# Patient Record
Sex: MALE | Race: WHITE | Employment: OTHER | URBAN - NONMETROPOLITAN AREA
[De-identification: names, ages, dates, MRNs, and addresses within clinical notes are randomized per-mention and may not be internally consistent; named-entity substitution may affect disease eponyms.]

---

## 2017-02-13 ENCOUNTER — OFFICE VISIT (OUTPATIENT)
Dept: FAMILY MEDICINE | Facility: OTHER | Age: 67
End: 2017-02-13
Payer: COMMERCIAL

## 2017-02-13 VITALS
TEMPERATURE: 97.9 F | BODY MASS INDEX: 36.83 KG/M2 | HEART RATE: 74 BPM | HEIGHT: 66 IN | WEIGHT: 229.2 LBS | OXYGEN SATURATION: 98 % | DIASTOLIC BLOOD PRESSURE: 88 MMHG | RESPIRATION RATE: 12 BRPM | SYSTOLIC BLOOD PRESSURE: 136 MMHG

## 2017-02-13 DIAGNOSIS — Z00.01 ENCOUNTER FOR ROUTINE ADULT MEDICAL EXAM WITH ABNORMAL FINDINGS: Primary | ICD-10-CM

## 2017-02-13 DIAGNOSIS — Z13.6 CARDIOVASCULAR SCREENING; LDL GOAL LESS THAN 130: ICD-10-CM

## 2017-02-13 DIAGNOSIS — I10 HYPERTENSION GOAL BP (BLOOD PRESSURE) < 140/90: ICD-10-CM

## 2017-02-13 DIAGNOSIS — E66.09 NON MORBID OBESITY DUE TO EXCESS CALORIES: ICD-10-CM

## 2017-02-13 LAB
ANION GAP SERPL CALCULATED.3IONS-SCNC: 5 MMOL/L (ref 3–14)
BUN SERPL-MCNC: 19 MG/DL (ref 7–30)
CALCIUM SERPL-MCNC: 8.9 MG/DL (ref 8.5–10.1)
CHLORIDE SERPL-SCNC: 109 MMOL/L (ref 94–109)
CHOLEST SERPL-MCNC: 183 MG/DL
CO2 SERPL-SCNC: 32 MMOL/L (ref 20–32)
CREAT SERPL-MCNC: 1.13 MG/DL (ref 0.66–1.25)
GFR SERPL CREATININE-BSD FRML MDRD: 65 ML/MIN/1.7M2
GLUCOSE SERPL-MCNC: 121 MG/DL (ref 70–99)
HDLC SERPL-MCNC: 52 MG/DL
LDLC SERPL CALC-MCNC: 102 MG/DL
NONHDLC SERPL-MCNC: 131 MG/DL
POTASSIUM SERPL-SCNC: 4.8 MMOL/L (ref 3.4–5.3)
SODIUM SERPL-SCNC: 146 MMOL/L (ref 133–144)
TRIGL SERPL-MCNC: 144 MG/DL

## 2017-02-13 PROCEDURE — 80061 LIPID PANEL: CPT | Performed by: FAMILY MEDICINE

## 2017-02-13 PROCEDURE — 99000 SPECIMEN HANDLING OFFICE-LAB: CPT | Performed by: FAMILY MEDICINE

## 2017-02-13 PROCEDURE — 80307 DRUG TEST PRSMV CHEM ANLYZR: CPT | Mod: 90 | Performed by: FAMILY MEDICINE

## 2017-02-13 PROCEDURE — 99397 PER PM REEVAL EST PAT 65+ YR: CPT | Performed by: FAMILY MEDICINE

## 2017-02-13 PROCEDURE — 36415 COLL VENOUS BLD VENIPUNCTURE: CPT | Performed by: FAMILY MEDICINE

## 2017-02-13 PROCEDURE — 80048 BASIC METABOLIC PNL TOTAL CA: CPT | Performed by: FAMILY MEDICINE

## 2017-02-13 RX ORDER — METOPROLOL TARTRATE 100 MG
100 TABLET ORAL DAILY
Qty: 90 TABLET | Refills: 3 | Status: SHIPPED | OUTPATIENT
Start: 2017-02-13 | End: 2018-03-19

## 2017-02-13 ASSESSMENT — PAIN SCALES - GENERAL: PAINLEVEL: NO PAIN (0)

## 2017-02-13 NOTE — LETTER
[facility]  [address] [facility] [address]  Phone: [phone]          February 16, 2017    Alec Freitas  [address] [place] [id number]          Dear Alec,      LAB RESULTS:     The results of your recent urine nicotine, Lipid profile and Renal profile were NORMAL. Your fasting glucose was a little higher than in past years. Please work on diet and exercise and plan to recheck in 1 year. If you have any further questions or problems, please contact our office.          Sincerely,      Harjit Hernandez M.D.

## 2017-02-13 NOTE — PROGRESS NOTES
SUBJECTIVE:                                                            Alec Freitas is a 66 year old male who presents for Preventive Visit.      Are you in the first 12 months of your Medicare Part B coverage?  Not on medicare    Healthy Habits:    Do you get at least three servings of calcium containing foods daily (dairy, green leafy vegetables, etc.)? yes    Amount of exercise or daily activities, outside of work: 3-4 day(s) per week    Problems taking medications regularly No    Medication side effects: No    Have you had an eye exam in the past two years? yes    Do you see a dentist twice per year? yes    Do you have sleep apnea, excessive snoring or daytime drowsiness?no    COGNITIVE SCREEN  1) Repeat 3 items (Banana, Sunrise, Chair)    2) Clock draw: NORMAL  3) 3 item recall: Recalls 3 objects  Results: NORMAL clock, 1-2 items recalled: COGNITIVE IMPAIRMENT LESS LIKELY    Mini-CogTM Copyright S Maren. Licensed by the author for use in [facility]; reprinted with permission (jennifer@[email or web address]). All rights reserved.                All Histories reviewed and updated in [facility] as appropriate.  Social History   Substance Use Topics     Smoking status: Never Smoker     Smokeless tobacco: Former User     Types: Chew     Alcohol use 1.0 oz/week      Comment: once in a while       The patient does not drink >3 drinks per day nor >7 drinks per week.    Today's PHQ-2 Score:   PHQ-2 ( 1999 Pf[employer]) 2/10/2016 10/23/2015   Q1: Little interest or pleasure in doing things 0 0   Q2: Feeling down, depressed or hopeless 0 0   PHQ-2 Score 0 0       Do you feel safe in your environment - Yes    Do you have a Health Care Directive?: No: Advance care planning was reviewed with patient; patient declined at this time.    Current providers sharing in care for this patient include:   Patient Care Team:  Harjit Hernandez MD as PCP - General      Hearing impairment: No    Ability to successfully perform activities of daily  living: Yes, no assistance needed     Fall risk:  Fallen 2 or more times in the past year?: No  Any fall with injury in the past year?: No    Home safety:  none identified      The following health maintenance items are reviewed in Epic and correct as of today:  Health Maintenance   Topic Date Due     HEPATITIS C SCREENING  03/04/1968     PNEUMOCOCCAL (1 of 2 - PCV13) 03/04/2015     AORTIC ANEURYSM SCREENING (SYSTEM ASSIGNED)  03/04/2015     COLONOSCOPY Q10 YR INBASKET MESSAGE  04/28/2016     INFLUENZA VACCINE (SYSTEM ASSIGNED)  09/01/2016     ADVANCE DIRECTIVE PLANNING Q5 YRS (NO INBASKET)  01/02/2017     FALL RISK ASSESSMENT  02/10/2017     LIPID SCREEN Q5 YR MALE (SYSTEM ASSIGNED)  02/10/2021     TETANUS IMMUNIZATION (SYSTEM ASSIGNED)  02/10/2026         Pneumonia Vaccine:Adults age 65+ who have not received previous Pneumovax (PPSV23) or PCV13 as an adult: Should first be given PCV13 AND then should be given PPSV23 6-12 months after PCV13     ROS:  C: NEGATIVE for fever, chills, change in weight  INTEGUMENTARY/SKIN: NEGATIVE for worrisome rashes, moles or lesions and POSITIVE for rash lower legs right  E/M: NEGATIVE for ear, mouth and throat problems  R: NEGATIVE for significant cough or SOB  CV: NEGATIVE for chest pain, palpitations or peripheral edema  ROS otherwise negative    Problem list, Medication list, Allergies, and Medical/Social/Surgical histories reviewed in EPIC and updated as appropriate.  Labs reviewed in EPIC  BP Readings from Last 3 Encounters:   02/13/17 136/88   08/11/16 143/87   02/10/16 138/88    Wt Readings from Last 3 Encounters:   02/13/17 229 lb 3.2 oz (104 kg)   02/10/16 228 lb 3.2 oz (103.5 kg)   10/23/15 230 lb (104.3 kg)                  Patient Active Problem List   Diagnosis     CARDIOVASCULAR SCREENING; LDL GOAL LESS THAN 130     Hypertension goal BP (blood pressure) < 140/90     Advanced directives, counseling/discussion     Past Surgical History   Procedure Laterality Date      "Hc colonoscopy w/wo brush/wash  04/28/06     Repeat in 10 years     Fusion lumbar posterior artificial disk with system  8/30/10     [facility]       Social History   Substance Use Topics     Smoking status: Never Smoker     Smokeless tobacco: Former User     Types: Chew     Alcohol use 1.0 oz/week      Comment: once in a while     Family History   Problem Relation Age of Onset     Hypertension Mother      C.A.D. Mother      Hypertension Brother      CEREBROVASCULAR DISEASE Maternal Grandmother      CEREBROVASCULAR DISEASE Maternal Grandfather      CANCER Father      unknown primary spread to lungs     HEART DISEASE Father      pacemaker-since he was in his 70's     C.A.D. Brother      Stent X 4     Anesthesia Reaction No family hx of          Current Outpatient Prescriptions   Medication Sig Dispense Refill     Naproxen Sodium (ALEVE PO)        metoprolol (LOPRESSOR) 100 MG tablet Take 1 tablet (100 mg) by mouth daily 90 tablet 3     ASPIRIN 81 MG OR TABS one daily 100 0     ibuprofen (MOTRIN CHILD DROPS) 40 MG/ML suspension Take by mouth every 6 hours as needed for moderate pain or fever Reported on 2/13/2017       [DISCONTINUED] metoprolol (LOPRESSOR) 100 MG tablet Take 1 tablet (100 mg) by mouth daily 90 tablet 3     Allergies   Allergen Reactions     No Known Drug Allergies      Recent Labs   Lab Test  02/10/16   0941  01/26/15   0810  01/02/14   1648  12/03/12   1634   LDL  90   --   107  117   HDL  50   --   54  46   TRIG  115   --   154*  120   CR  1.12  1.09  1.05   --    GFRESTIMATED  66  68  71   --    GFRESTBLACK  79  82  86   --    POTASSIUM  4.5  4.6  5.1   --       OBJECTIVE:                                                            /90 (BP Location: Left arm, Patient Position: Chair, Cuff Size: Adult Large)  Pulse 74  Temp 97.9  F (36.6  C) (Tympanic)  Resp 12  Ht 5' 5.95\" (1.675 m)  Wt 229 lb 3.2 oz (104 kg)  SpO2 98%  BMI 37.06 kg/m2 Estimated body mass index is 37.06 kg/(m^2) " "as calculated from the following:    Height as of this encounter: 5' 5.95\" (1.675 m).    Weight as of this encounter: 229 lb 3.2 oz (104 kg).  EXAM:   GENERAL: healthy, alert and no distress  EYES: Eyes grossly normal to inspection, PERRL and conjunctivae and sclerae normal  HENT: ear canals and TM's normal, nose and mouth without ulcers or lesions  NECK: no adenopathy, no asymmetry, masses, or scars and thyroid normal to palpation  RESP: lungs clear to auscultation - no rales, rhonchi or wheezes  CV: regular rate and rhythm, normal S1 S2, no S3 or S4, no murmur, click or rub, no peripheral edema and peripheral pulses strong  ABDOMEN: soft, nontender, no hepatosplenomegaly, no masses and bowel sounds normal  MS: no gross musculoskeletal defects noted, no edema  SKIN: no suspicious lesions or rashes and lichenification - lower legs  NEURO: Normal strength and tone, mentation intact and speech normal  PSYCH: mentation appears normal, affect normal/bright    ASSESSMENT / PLAN:                                                                ICD-10-CM    1. Encounter for routine adult medical exam with abnormal findings Z00.01 Nicotine and Cotinine Urine   2. Hypertension goal BP (blood pressure) < 140/90 I10 Basic metabolic panel     metoprolol (LOPRESSOR) 100 MG tablet   3. CARDIOVASCULAR SCREENING; LDL GOAL LESS THAN 130 Z13.6 Lipid panel reflex to direct LDL       End of Life Planning:  Patient currently has an advanced directive: No.  I have verified the patient's ablity to prepare an advanced directive/make health care decisions.  Literature was provided to assist patient in preparing an advanced directive.    COUNSELING:  Reviewed preventive health counseling, as reflected in patient instructions       Regular exercise       Healthy diet/nutrition       Vision screening       Vaccinated for: Pneumococcal and Zoster       Colon cancer screening        Estimated body mass index is 37.06 kg/(m^2) as calculated from " "the following:    Height as of this encounter: 5' 5.95\" (1.675 m).    Weight as of this encounter: 229 lb 3.2 oz (104 kg).  Weight management plan: Discussed healthy diet and exercise guidelines and patient will follow up in 12 months in clinic to re-evaluate.   reports that he has never smoked. He has quit using smokeless tobacco. His smokeless tobacco use included Chew.      Appropriate preventive services were discussed with this patient, including applicable screening as appropriate for cardiovascular disease, diabetes, osteopenia/osteoporosis, and glaucoma.  As appropriate for age/gender, discussed screening for colorectal cancer, prostate cancer, breast cancer, and cervical cancer. Checklist reviewing preventive services available has been given to the patient.    Reviewed patients plan of care and provided an AVS. The Basic Care Plan (routine screening as documented in Health Maintenance) for Alec meets the Care Plan requirement. This Care Plan has been established and reviewed with the Patient.    Counseling Resources:  ATP IV Guidelines  Pooled Cohorts Equation Calculator  Breast Cancer Risk Calculator  FRAX Risk Assessment  ICSI Preventive Guidelines  Dietary Guidelines for Americans, 2010  USDA's MyPlate  ASA Prophylaxis  Lung CA Screening    Harjit Hernandez MD  [facility]  "

## 2017-02-13 NOTE — MR AVS SNAPSHOT
After Visit Summary   2/13/2017    Alec Freitas    MRN: [id number]           Patient Information     Date Of Birth          1950        Visit Information        Provider Department      2/13/2017 8:40 AM Harjit Hernandez MD [facility]        Today's Diagnoses     Encounter for Medicare annual wellness exam    -  1    Hypertension goal BP (blood pressure) < 140/90        CARDIOVASCULAR SCREENING; LDL GOAL LESS THAN 130          Care Instructions      Preventive Health Recommendations:   Male Ages 65 and over    Yearly exam:             See your health care provider every year in order to  o   Review health changes.   o   Discuss preventive care.    o   Review your medicines if your doctor has prescribed any.    Talk with your health care provider about whether you should have a test to screen for prostate cancer (PSA).    Every 3 years, have a diabetes test (fasting glucose). If you are at risk for diabetes, you should have this test more often.    Every 5 years, have a cholesterol test. Have this test more often if you are at risk for high cholesterol or heart disease.     Every 10 years, have a colonoscopy. Or, have a yearly FIT test (stool test). These exams will check for colon cancer.    Talk to with your health care provider about screening for Abdominal Aortic Aneurysm if you have a family history of AAA or have a history of smoking.    Shots:     Get a flu shot each year.     Get a tetanus shot every 10 years.     Talk to your doctor about your pneumonia vaccines. There are now two you should receive - Pneumovax (PPSV 23) and Prevnar (PCV 13).     Talk to your doctor about a shingles vaccine.     Talk to your doctor about the hepatitis B vaccine.  Nutrition:     Eat at least 5 servings of fruits and vegetables each day.     Eat whole-grain bread, whole-wheat pasta and brown rice instead of white grains and rice.     Talk to your provider about Calcium and Vitamin D.  "  Lifestyle    Exercise for at least 150 minutes a week (30 minutes a day, 5 days a week). This will help you control your weight and prevent disease.     Limit alcohol to one drink per day.     No smoking.     Wear sunscreen to prevent skin cancer.     See your dentist every six months for an exam and cleaning.     See your eye doctor every 1 to 2 years to screen for conditions such as glaucoma, macular degeneration, cataracts, etc         Follow-ups after your visit        Follow-up notes from your care team     Return in about 1 year (around 2/13/2018) for Physical Exam.      Who to contact     If you have questions or need follow up information about today's clinic visit or your schedule please contact [facility] directly at [phone].  Normal or non-critical lab and imaging results will be communicated to you by zLensehart, letter or phone within 4 business days after the clinic has received the results. If you do not hear from us within 7 days, please contact the clinic through zLensehart or phone. If you have a critical or abnormal lab result, we will notify you by phone as soon as possible.  Submit refill requests through Aurality or call your pharmacy and they will forward the refill request to us. Please allow 3 business days for your refill to be completed.          Additional Information About Your Visit        Aurality Information     Aurality lets you send messages to your doctor, view your test results, renew your prescriptions, schedule appointments and more. To sign up, go to [email or web address] . Click on \"Log in\" on the left side of the screen, which will take you to the Welcome page. Then click on \"Sign up Now\" on the right side of the page.     You will be asked to enter the access code listed below, as well as some personal information. Please follow the directions to create your username and password.     Your access code is: [email or web address]  Expires: 5/14/2017  9:13 AM     Your access " "code will  in 90 days. If you need help or a new code, please call your [facility] clinic or [phone].        Care EveryWhere ID     This is your Care EveryWhere ID. This could be used by other organizations to access your [facility] medical records  [id number]        Your Vitals Were     Pulse Temperature Respirations Height Pulse Oximetry BMI (Body Mass Index)    74 97.9  F (36.6  C) (Tympanic) 12 5' 5.95\" (1.675 m) 98% 37.06 kg/m2       Blood Pressure from Last 3 Encounters:   17 136/88   16 143/87   02/10/16 138/88    Weight from Last 3 Encounters:   17 229 lb 3.2 oz (104 kg)   02/10/16 228 lb 3.2 oz (103.5 kg)   10/23/15 230 lb (104.3 kg)              We Performed the Following     Basic metabolic panel     Lipid panel reflex to direct LDL     Nicotine and Cotinine Urine          Where to get your medicines      [facility] [address] [facility]  [address] [place] [address]     Phone:  [phone]     metoprolol 100 MG tablet          Primary Care Provider Office Phone # Fax #    Harjit Hernandez [phone]       [facility] [address] [facility] [address]        Thank you!     Thank you for choosing [facility]  for your care. Our goal is always to provide you with excellent care. Hearing back from our patients is one way we can continue to improve our services. Please take a few minutes to complete the written survey that you may receive in the mail after your visit with us. Thank you!             Your Updated Medication List - Protect others around you: Learn how to safely use, store and throw away your medicines at www.disposemymeds.org.          This list is accurate as of: 17  9:13 AM.  Always use your most recent med list.                   Brand Name Dispense Instructions for use    ALEVE PO          aspirin 81 MG tablet     100    one daily       ibuprofen 40 MG/ML suspension "    MOTRIN CHILD DROPS     Take by mouth every 6 hours as needed for moderate pain or fever Reported on 2/13/2017       metoprolol 100 MG tablet    LOPRESSOR    90 tablet    Take 1 tablet (100 mg) by mouth daily

## 2017-02-14 LAB — COTININE UR QL: NORMAL

## 2018-03-19 DIAGNOSIS — I10 HYPERTENSION GOAL BP (BLOOD PRESSURE) < 140/90: ICD-10-CM

## 2018-03-20 RX ORDER — METOPROLOL TARTRATE 100 MG
TABLET ORAL
Qty: 30 TABLET | Refills: 0 | Status: SHIPPED | OUTPATIENT
Start: 2018-03-20 | End: 2018-04-13

## 2018-03-20 NOTE — TELEPHONE ENCOUNTER
"Requested Prescriptions   Pending Prescriptions Disp Refills     metoprolol tartrate (LOPRESSOR) 100 MG tablet [Pharmacy Med Name: METOPROLOL TARTRATE 100MG TABS] 90 tablet 3     Sig: TAKE ONE TABLET BY MOUTH EVERY DAY    Beta-Blockers Protocol Failed    3/19/2018  9:15 PM       Failed - Blood pressure under 140/90 in past 12 months    BP Readings from Last 3 Encounters:   02/13/17 136/88   08/11/16 143/87   02/10/16 138/88                Failed - Recent (12 mo) or future (30 days) visit within the authorizing provider's specialty    Patient had office visit in the last 12 months or has a visit in the next 30 days with authorizing provider or within the authorizing provider's specialty.  See \"Patient Info\" tab in inbasket, or \"Choose Columns\" in Meds & Orders section of the refill encounter.           Passed - Patient is age 6 or older        Last Written Prescription Date:  2/13/17  Last Fill Quantity: 90,  # refills: 3   Last office visit: 2/13/2017 with prescribing provider:     Future Office Visit:      "

## 2018-04-13 ENCOUNTER — OFFICE VISIT (OUTPATIENT)
Dept: FAMILY MEDICINE | Facility: OTHER | Age: 68
End: 2018-04-13
Payer: COMMERCIAL

## 2018-04-13 VITALS
HEIGHT: 66 IN | DIASTOLIC BLOOD PRESSURE: 82 MMHG | RESPIRATION RATE: 16 BRPM | OXYGEN SATURATION: 98 % | SYSTOLIC BLOOD PRESSURE: 122 MMHG | WEIGHT: 227.9 LBS | HEART RATE: 75 BPM | BODY MASS INDEX: 36.62 KG/M2 | TEMPERATURE: 96.5 F

## 2018-04-13 DIAGNOSIS — I10 HYPERTENSION GOAL BP (BLOOD PRESSURE) < 140/90: ICD-10-CM

## 2018-04-13 DIAGNOSIS — E66.01 MORBID OBESITY (H): ICD-10-CM

## 2018-04-13 DIAGNOSIS — Z00.00 MEDICARE ANNUAL WELLNESS VISIT, SUBSEQUENT: Primary | ICD-10-CM

## 2018-04-13 DIAGNOSIS — Z11.59 NEED FOR HEPATITIS C SCREENING TEST: ICD-10-CM

## 2018-04-13 LAB
ANION GAP SERPL CALCULATED.3IONS-SCNC: 7 MMOL/L (ref 3–14)
BUN SERPL-MCNC: 25 MG/DL (ref 7–30)
CALCIUM SERPL-MCNC: 8.4 MG/DL (ref 8.5–10.1)
CHLORIDE SERPL-SCNC: 107 MMOL/L (ref 94–109)
CO2 SERPL-SCNC: 28 MMOL/L (ref 20–32)
CREAT SERPL-MCNC: 1 MG/DL (ref 0.66–1.25)
GFR SERPL CREATININE-BSD FRML MDRD: 74 ML/MIN/1.7M2
GLUCOSE SERPL-MCNC: 128 MG/DL (ref 70–99)
HCV AB SERPL QL IA: NONREACTIVE
POTASSIUM SERPL-SCNC: 4.5 MMOL/L (ref 3.4–5.3)
SODIUM SERPL-SCNC: 142 MMOL/L (ref 133–144)

## 2018-04-13 PROCEDURE — 80048 BASIC METABOLIC PNL TOTAL CA: CPT | Performed by: FAMILY MEDICINE

## 2018-04-13 PROCEDURE — 36415 COLL VENOUS BLD VENIPUNCTURE: CPT | Performed by: FAMILY MEDICINE

## 2018-04-13 PROCEDURE — G0439 PPPS, SUBSEQ VISIT: HCPCS | Performed by: FAMILY MEDICINE

## 2018-04-13 PROCEDURE — 86803 HEPATITIS C AB TEST: CPT | Performed by: FAMILY MEDICINE

## 2018-04-13 RX ORDER — METOPROLOL TARTRATE 100 MG
100 TABLET ORAL DAILY
Qty: 90 TABLET | Refills: 3 | Status: SHIPPED | OUTPATIENT
Start: 2018-04-13

## 2018-04-13 ASSESSMENT — ACTIVITIES OF DAILY LIVING (ADL)
CURRENT_FUNCTION: NO ASSISTANCE NEEDED
I_NEED_ASSISTANCE_FOR_THE_FOLLOWING_DAILY_ACTIVITIES:: NO ASSISTANCE IS NEEDED

## 2018-04-13 ASSESSMENT — PAIN SCALES - GENERAL: PAINLEVEL: NO PAIN (0)

## 2018-04-13 NOTE — NURSING NOTE
"Chief Complaint   Patient presents with     Wellness Visit       Initial BP (!) 152/98 (BP Location: Right arm, Patient Position: Sitting, Cuff Size: Adult Large)  Pulse 75  Temp 96.5  F (35.8  C) (Temporal)  Resp 16  Ht 5' 5.75\" (1.67 m)  Wt 227 lb 14.4 oz (103.4 kg)  SpO2 98%  BMI 37.06 kg/m2 Estimated body mass index is 37.06 kg/(m^2) as calculated from the following:    Height as of this encounter: 5' 5.75\" (1.67 m).    Weight as of this encounter: 227 lb 14.4 oz (103.4 kg).  Medication Reconciliation: complete     Health Maintenance Due   Topic Date Due     HEPATITIS C SCREENING  03/04/1968     PNEUMOCOCCAL (1 of 2 - PCV13) 03/04/2015     AORTIC ANEURYSM SCREENING (SYSTEM ASSIGNED)  03/04/2015     ADVANCE DIRECTIVE PLANNING Q5 YRS  01/02/2017     FALL RISK ASSESSMENT  02/13/2018     Lluvia Valdez CMA      "

## 2018-04-13 NOTE — LETTER
April 16, 2018      Alec Freitas  [address] [facility] [address]        Dear Mr.Van Paez,    We are writing to inform you of your test results.    Kidney function remains stable. Fasting glucose was slightly elevated and should be rechecked in 4-6 weeks. One time screening for hepatitis C was negative.     Resulted Orders   Basic metabolic panel  (Ca, Cl, CO2, Creat, Gluc, K, Na, BUN)   Result Value Ref Range    Sodium 142 133 - 144 mmol/L    Potassium 4.5 3.4 - 5.3 mmol/L    Chloride 107 94 - 109 mmol/L    Carbon Dioxide 28 20 - 32 mmol/L    Anion Gap 7 3 - 14 mmol/L    Glucose 128 (H) 70 - 99 mg/dL      Comment:      Fasting specimen    Urea Nitrogen 25 7 - 30 mg/dL    Creatinine 1.00 0.66 - 1.25 mg/dL    GFR Estimate 74 >60 mL/min/1.7m2      Comment:      Non  GFR Calc    GFR Estimate If Black 90 >60 mL/min/1.7m2      Comment:       GFR Calc    Calcium 8.4 (L) 8.5 - 10.1 mg/dL   Hepatitis C antibody   Result Value Ref Range    Hepatitis C Antibody Nonreactive NR^Nonreactive      Comment:      Assay performance characteristics have not been established for newborns,   infants, and children         If you have any questions or concerns, please call the clinic at the number listed above.       Sincerely,        Harjit Hernandez MD

## 2018-04-13 NOTE — PROGRESS NOTES
SUBJECTIVE:   Alec Freitas is a 68 year old male who presents for Preventive Visit.      Are you in the first 12 months of your Medicare coverage?  No    Physical   Annual:     Getting at least 3 servings of Calcium per day::  Yes    Bi-annual eye exam::  Yes    Dental care twice a year::  Yes    Sleep apnea or symptoms of sleep apnea::  None    Diet::  Regular (no restrictions)    Taking medications regularly::  Yes    Medication side effects::  None    Additional concerns today::  YES    Ability to successfully perform activities of daily living: no assistance needed  Home Safety:  No safety concerns identified  Hearing Impairment: no hearing concerns        Fall risk:  Fallen 2 or more times in the past year?: No  Any fall with injury in the past year?: No    COGNITIVE SCREEN  1) Repeat 3 items (Banana, Sunrise, Chair)    2) Clock draw: NORMAL  3) 3 item recall: Recalls 2 objects   Results: NORMAL clock, 1-2 items recalled: COGNITIVE IMPAIRMENT LESS LIKELY    Mini-CogTM Copyright IMELDA Engel. Licensed by the author for use in [facility]; reprinted with permission (jennifer@[facility]). All rights reserved.        Reviewed and updated as needed this visit by clinical staff  Tobacco  Allergies  Meds  Problems  Soc Hx        Reviewed and updated as needed this visit by Provider  Allergies  Meds  Problems        Social History   Substance Use Topics     Smoking status: Never Smoker     Smokeless tobacco: Former User     Types: Chew     Alcohol use 1.0 oz/week      Comment: once in a while       Alcohol Use 4/13/2018   If you drink alcohol do you typically have greater than 3 drinks per day OR greater than 7 drinks per week? Yes   AUDIT SCORE  4           PROBLEMS TO ADD ON...  Hypertension Follow-up      Outpatient blood pressures are not being checked.    Low Salt Diet: no added salt      Today's PHQ-2 Score:   PHQ-2 ( 1999 Pfizer) 4/13/2018   Q1: Little interest or pleasure in doing things 0   Q2:  Feeling down, depressed or hopeless 0   PHQ-2 Score 0   Q1: Little interest or pleasure in doing things Not at all   Q2: Feeling down, depressed or hopeless Not at all   PHQ-2 Score 0       Do you feel safe in your environment - Yes    Do you have a Health Care Directive?: No: Advance care planning reviewed with patient; information given to patient to review.    Current providers sharing in care for this patient include:   Patient Care Team:  Harjit Hernandez MD as PCP - General    The following health maintenance items are reviewed in Epic and correct as of today:  Health Maintenance   Topic Date Due     HEPATITIS C SCREENING  [birth year]04/1968     PNEUMOCOCCAL (1 of 2 - PCV13) 03/04/2015     AORTIC ANEURYSM SCREENING (SYSTEM ASSIGNED)  03/04/2015     ADVANCE DIRECTIVE PLANNING Q5 YRS  01/02/2017     FALL RISK ASSESSMENT  02/13/2018     INFLUENZA VACCINE (SYSTEM ASSIGNED)  09/01/2018     COLONOSCOPY Q5 YR  10/21/2021     LIPID SCREEN Q5 YR MALE (SYSTEM ASSIGNED)  02/13/2022     TETANUS IMMUNIZATION (SYSTEM ASSIGNED)  02/10/2026     Labs reviewed in EPIC  BP Readings from Last 3 Encounters:   04/13/18 (!) 152/98   02/13/17 136/88   08/11/16 143/87    Wt Readings from Last 3 Encounters:   04/13/18 227 lb 14.4 oz (103.4 kg)   02/13/17 229 lb 3.2 oz (104 kg)   02/10/16 228 lb 3.2 oz (103.5 kg)                  Patient Active Problem List   Diagnosis     CARDIOVASCULAR SCREENING; LDL GOAL LESS THAN 130     Hypertension goal BP (blood pressure) < 140/90     Advanced directives, counseling/discussion     Non morbid obesity due to excess calories     Morbid obesity (H)     Past Surgical History:   Procedure Laterality Date     FUSION LUMBAR POSTERIOR ARTIFICIAL DISK WITH SYSTEM  8/30/10    [facility]     HC COLONOSCOPY W/WO BRUSH/WASH  04/28/06    Repeat in 10 years       Social History   Substance Use Topics     Smoking status: Never Smoker     Smokeless tobacco: Former User     Types: Chew     Alcohol use 1.0  "oz/week      Comment: once in a while     Family History   Problem Relation Age of Onset     Hypertension Mother      C.A.D. Mother      Hypertension Brother      CEREBROVASCULAR DISEASE Maternal Grandmother      CEREBROVASCULAR DISEASE Maternal Grandfather      CANCER Father      unknown primary spread to lungs     HEART DISEASE Father      pacemaker-since he was in his 70's     C.A.D. Brother      Stent X 4     Anesthesia Reaction No family hx of          Current Outpatient Prescriptions   Medication Sig Dispense Refill     metoprolol tartrate (LOPRESSOR) 100 MG tablet TAKE ONE TABLET BY MOUTH EVERY DAY 30 tablet 0     Naproxen Sodium (ALEVE PO)        ASPIRIN 81 MG OR TABS one daily 100 0     Allergies   Allergen Reactions     No Known Drug Allergies      Recent Labs   Lab Test  02/13/17   0912  02/10/16   0941   01/02/14   1648   LDL  102*  90   --   107   HDL  52  50   --   54   TRIG  144  115   --   154*   CR  1.13  1.12   < >  1.05   GFRESTIMATED  65  66   < >  71   GFRESTBLACK  78  79   < >  86   POTASSIUM  4.8  4.5   < >  5.1    < > = values in this interval not displayed.            Review of Systems  CONSTITUTIONAL: NEGATIVE for fever, chills, change in weight  INTEGUMENTARY/SKIN: POSITIVE for epidermoid cyst on right parietal scalp.  ENT/MOUTH: NEGATIVE for ear, mouth and throat problems  RESP: NEGATIVE for significant cough or SOB  CV: NEGATIVE for chest pain, palpitations or peripheral edema  ROS otherwise negative    OBJECTIVE:   BP (!) 152/98 (BP Location: Right arm, Patient Position: Sitting, Cuff Size: Adult Large)  Pulse 75  Temp 96.5  F (35.8  C) (Temporal)  Resp 16  Ht 5' 5.75\" (1.67 m)  Wt 227 lb 14.4 oz (103.4 kg)  SpO2 98%  BMI 37.06 kg/m2 Estimated body mass index is 37.06 kg/(m^2) as calculated from the following:    Height as of this encounter: 5' 5.75\" (1.67 m).    Weight as of this encounter: 227 lb 14.4 oz (103.4 kg).  Physical Exam  GENERAL: alert, no distress and obese  EYES: " Eyes grossly normal to inspection, PERRL and conjunctivae and sclerae normal  HENT: ear canals and TM's normal, nose and mouth without ulcers or lesions  NECK: no adenopathy, no asymmetry, masses, or scars and thyroid normal to palpation  RESP: lungs clear to auscultation - no rales, rhonchi or wheezes  CV: regular rate and rhythm, normal S1 S2, no S3 or S4, no murmur, click or rub, no peripheral edema and peripheral pulses strong  ABDOMEN: soft, nontender, no hepatosplenomegaly, no masses and bowel sounds normal  MS: no gross musculoskeletal defects noted, no edema  SKIN: no suspicious lesions or rashes and SKIN: Cystic cutaneous structure on right parietal scalp. Approximately 1.5 cm with fluctuance and mild surrounding erythema. Not pointing.  NEURO: Normal strength and tone, mentation intact and speech normal  PSYCH: mentation appears normal, affect normal/bright    ASSESSMENT / PLAN:       ICD-10-CM    1. Medicare annual wellness visit, subsequent Z00.00    2. Hypertension goal BP (blood pressure) < 140/90 I10 Basic metabolic panel  (Ca, Cl, CO2, Creat, Gluc, K, Na, BUN)     metoprolol tartrate (LOPRESSOR) 100 MG tablet     CANCELED: Lipid panel reflex to direct LDL Fasting   3. Need for hepatitis C screening test Z11.59 Hepatitis C antibody   4. Morbid obesity (H) E66.01        End of Life Planning:  Patient currently has an advanced directive: No.  I have verified the patient's ablity to prepare an advanced directive/make health care decisions.  Literature was provided to assist patient in preparing an advanced directive.    COUNSELING:  Reviewed preventive health counseling, as reflected in patient instructions       Consider AAA screening for ages 65-75 and smoking history       Regular exercise       Healthy diet/nutrition       Vision screening       Hearing screening       Immunizations    Declined: Pneumococcal due to Conscientious objector               Aspirin Prophylaxsis       Hepatitis C  "screening       Consider lung cancer screening for ages 55-80 years and 30 pack-year smoking history       Colon cancer screening       Follow up for sebaceous cyst removal.        Estimated body mass index is 37.06 kg/(m^2) as calculated from the following:    Height as of this encounter: 5' 5.75\" (1.67 m).    Weight as of this encounter: 227 lb 14.4 oz (103.4 kg).  Weight management plan: Discussed healthy diet and exercise guidelines and patient will follow up in 1 month in clinic to re-evaluate.   reports that he has never smoked. He has quit using smokeless tobacco. His smokeless tobacco use included Chew.      Appropriate preventive services were discussed with this patient, including applicable screening as appropriate for cardiovascular disease, diabetes, osteopenia/osteoporosis, and glaucoma.  As appropriate for age/gender, discussed screening for colorectal cancer, prostate cancer, breast cancer, and cervical cancer. Checklist reviewing preventive services available has been given to the patient.    Reviewed patients plan of care and provided an AVS. The Basic Care Plan (routine screening as documented in Health Maintenance) for Alec meets the Care Plan requirement. This Care Plan has been established and reviewed with the Patient.    Counseling Resources:  ATP IV Guidelines  Pooled Cohorts Equation Calculator  Breast Cancer Risk Calculator  FRAX Risk Assessment  ICSI Preventive Guidelines  Dietary Guidelines for Americans, 2010  USDA's MyPlate  ASA Prophylaxis  Lung CA Screening    Harjit Hernandez MD  [facility]  "

## 2018-04-13 NOTE — MR AVS SNAPSHOT
After Visit Summary   4/13/2018    Alec Freitas    MRN: [id number]           Patient Information     Date Of Birth          1950        Visit Information        Provider Department      4/13/2018 9:00 AM Harjit Hernandez MD [facility]        Today's Diagnoses     Medicare annual wellness visit, subsequent    -  1    Hypertension goal BP (blood pressure) < 140/90        Need for hepatitis C screening test        Morbid obesity (H)          Care Instructions      Preventive Health Recommendations:       Male Ages 65 and over    Yearly exam:             See your health care provider every year in order to  o   Review health changes.   o   Discuss preventive care.    o   Review your medicines if your doctor has prescribed any.    Talk with your health care provider about whether you should have a test to screen for prostate cancer (PSA).    Every 3 years, have a diabetes test (fasting glucose). If you are at risk for diabetes, you should have this test more often.    Every 5 years, have a cholesterol test. Have this test more often if you are at risk for high cholesterol or heart disease.     Every 10 years, have a colonoscopy. Or, have a yearly FIT test (stool test). These exams will check for colon cancer.    Talk to with your health care provider about screening for Abdominal Aortic Aneurysm if you have a family history of AAA or have a history of smoking.  Shots:     Get a flu shot each year.     Get a tetanus shot every 10 years.     Talk to your doctor about your pneumonia vaccines. There are now two you should receive - Pneumovax (PPSV 23) and Prevnar (PCV 13).    Talk to your doctor about a shingles vaccine.     Talk to your doctor about the hepatitis B vaccine.  Nutrition:     Eat at least 5 servings of fruits and vegetables each day.     Eat whole-grain bread, whole-wheat pasta and brown rice instead of white grains and rice.     Talk to your doctor about Calcium and Vitamin  "D.   Lifestyle    Exercise for at least 150 minutes a week (30 minutes a day, 5 days a week). This will help you control your weight and prevent disease.     Limit alcohol to one drink per day.     No smoking.     Wear sunscreen to prevent skin cancer.     See your dentist every six months for an exam and cleaning.     See your eye doctor every 1 to 2 years to screen for conditions such as glaucoma, macular degeneration and cataracts.          Follow-ups after your visit        Follow-up notes from your care team     Return in about 1 month (around 5/13/2018) for sebaceous cyst excision 40 minutes procedure.      Who to contact     If you have questions or need follow up information about today's clinic visit or your schedule please contact [facility] directly at [phone].  Normal or non-critical lab and imaging results will be communicated to you by [employer], letter or phone within 4 business days after the clinic has received the results. If you do not hear from us within 7 days, please contact the clinic through [employer] or phone. If you have a critical or abnormal lab result, we will notify you by phone as soon as possible.  Submit refill requests through [employer] or call your pharmacy and they will forward the refill request to us. Please allow 3 business days for your refill to be completed.          Additional Information About Your Visit        [employer] Information     [employer] lets you send messages to your doctor, view your test results, renew your prescriptions, schedule appointments and more. To sign up, go to [email or web address] . Click on \"Log in\" on the left side of the screen, which will take you to the Welcome page. Then click on \"Sign up Now\" on the right side of the page.     You will be asked to enter the access code listed below, as well as some personal information. Please follow the directions to create your username and password.     Your access code is: [email or web address]  Expires: " "2018  9:29 AM     Your access code will  in 90 days. If you need help or a new code, please call your [facility] clinic or [phone].        Care EveryWhere ID     This is your Care EveryWhere ID. This could be used by other organizations to access your [facility] medical records  [id number]        Your Vitals Were     Pulse Temperature Respirations Height Pulse Oximetry BMI (Body Mass Index)    75 96.5  F (35.8  C) (Temporal) 16 5' 5.75\" (1.67 m) 98% 37.06 kg/m2       Blood Pressure from Last 3 Encounters:   18 122/82   17 136/88   16 143/87    Weight from Last 3 Encounters:   18 227 lb 14.4 oz (103.4 kg)   17 229 lb 3.2 oz (104 kg)   02/10/16 228 lb 3.2 oz (103.5 kg)              We Performed the Following     Basic metabolic panel  (Ca, Cl, CO2, Creat, Gluc, K, Na, BUN)     Hepatitis C antibody          Today's Medication Changes          These changes are accurate as of 18  9:29 AM.  If you have any questions, ask your nurse or doctor.               These medicines have changed or have updated prescriptions.        Dose/Directions    metoprolol tartrate 100 MG tablet   Commonly known as:  LOPRESSOR   This may have changed:  See the new instructions.   Used for:  Hypertension goal BP (blood pressure) < 140/90   Changed by:  Harjit Hernandez MD        Dose:  100 mg   Take 1 tablet (100 mg) by mouth daily   Quantity:  90 tablet   Refills:  3            Where to get your medicines      These medications were sent to [facility] [address]   [address] [facility] [address]     Phone:  [phone]     metoprolol tartrate 100 MG tablet                Primary Care Provider Office Phone # Fax #    Harjit Hernandez [phone]       [address] [facility] [address]        Equal Access to Services     CHARLEEN MICHEL AH: Hadii aad ku hadashyaritza Soomaali, waaxda luqadaha, qaybta kaalmatoni byrne, susi chicas. " So [facility] [phone].    ATENCIÓN: Si habla shashank, tiene a ortiz disposición servicios gratuitos de asistencia lingüística. Brando al [phone].    We comply with applicable federal civil rights laws and [place] laws. We do not discriminate on the basis of race, color, national origin, age, disability, sex, sexual orientation, or gender identity.            Thank you!     Thank you for choosing [facility]  for your care. Our goal is always to provide you with excellent care. Hearing back from our patients is one way we can continue to improve our services. Please take a few minutes to complete the written survey that you may receive in the mail after your visit with us. Thank you!             Your Updated Medication List - Protect others around you: Learn how to safely use, store and throw away your medicines at www.disposemymeds.org.          This list is accurate as of 4/13/18  9:29 AM.  Always use your most recent med list.                   Brand Name Dispense Instructions for use Diagnosis    ALEVE PO           aspirin 81 MG tablet     100    one daily    Essential hypertension, benign       metoprolol tartrate 100 MG tablet    LOPRESSOR    90 tablet    Take 1 tablet (100 mg) by mouth daily    Hypertension goal BP (blood pressure) < 140/90

## 2019-01-04 ENCOUNTER — TRANSFERRED RECORDS (OUTPATIENT)
Dept: HEALTH INFORMATION MANAGEMENT | Facility: CLINIC | Age: 69
End: 2019-01-04

## 2019-01-04 LAB — TSH SERPL-ACNC: 2.17 MIU/ML (ref 0.47–5)

## 2019-01-05 LAB
EJECTION FRACTION: 53
HBA1C MFR BLD: 6.8 % (ref 0–5.7)

## 2019-01-06 LAB
CHOLEST SERPL-MCNC: 178 MG/DL (ref 0–200)
EJECTION FRACTION: 53
HDLC SERPL-MCNC: 47 MG/DL
LDLC SERPL CALC-MCNC: 111 MG/DL (ref 0–159)
TRIGL SERPL-MCNC: 100 MG/DL (ref 30–150)

## 2019-01-08 LAB
ALT SERPL-CCNC: 66 U/L (ref 8–45)
AST SERPL-CCNC: 45 U/L (ref 5–41)

## 2019-01-09 LAB — INR PPP: 1.3 (ref 0.9–1.1)

## 2019-01-12 ENCOUNTER — TRANSFERRED RECORDS (OUTPATIENT)
Dept: HEALTH INFORMATION MANAGEMENT | Facility: CLINIC | Age: 69
End: 2019-01-12

## 2019-01-14 ENCOUNTER — TELEPHONE (OUTPATIENT)
Dept: FAMILY MEDICINE | Facility: OTHER | Age: 69
End: 2019-01-14

## 2019-01-14 LAB
CREAT SERPL-MCNC: 0.92 MG/DL (ref 0.72–1.25)
GFR SERPL CREATININE-BSD FRML MDRD: >60 ML/MIN/1.73M2
GLUCOSE SERPL-MCNC: 129 MG/DL (ref 70–100)
POTASSIUM SERPL-SCNC: 4.3 MMOL/L (ref 3.5–5.1)

## 2019-01-14 NOTE — TELEPHONE ENCOUNTER
Routing to pcp to advise if able to work in patient on 01/21/2019 for hospital follow up in a sameday or  Only slot?  Charla Gutierrez MA

## 2019-01-14 NOTE — TELEPHONE ENCOUNTER
Reason for Call:  Same Day Appointment, Requested Provider:  Harjit Hernandez M.D.    PCP: Harjit Hernandez    Reason for visit: work in request for 1/21/19    Duration of symptoms: post hospital follow up, post heart surgery    Have you been treated for this in the past? No    Additional comments: is needing post discharge visit this day.    Can we leave a detailed message on this number? YES    Phone number patient can be reached at: Home number on file [phone] (home)    Best Time: any time later today    Call taken on 1/14/2019 at 9:31 AM by Gia Ordaz

## 2019-01-14 NOTE — TELEPHONE ENCOUNTER
Patient called to schedule an appointment for a hospital follow-up or appeared on a report showing that they were recently discharged from the hospital.    Patient was admitted to  [facility]: 1/4/19  Discharged date: 1/14/19  Reason for hospital admission:  Heart Surgery  Does patient have future appointment scheduled with provider? No  Date of future appointment:  Dr Hernandez has no available spots, needs to be seen 1/21. Work in message sent.        This information will be used to help the care team plan for the patients upcoming visit.  The triage RN may determine that a follow up call is necessary and reach out to the patient via the phone number listed in the chart.     Please route this message on routine priority to the Triage RN pool.

## 2019-01-14 NOTE — TELEPHONE ENCOUNTER
OK for nava Martin Only at 1:00. Please call patient  to schedule time.  Electronically signed by Harjit Hernandez MD

## 2019-01-21 ENCOUNTER — OFFICE VISIT (OUTPATIENT)
Dept: FAMILY MEDICINE | Facility: OTHER | Age: 69
End: 2019-01-21
Payer: COMMERCIAL

## 2019-01-21 VITALS
HEART RATE: 66 BPM | DIASTOLIC BLOOD PRESSURE: 68 MMHG | OXYGEN SATURATION: 97 % | WEIGHT: 217.2 LBS | TEMPERATURE: 98 F | BODY MASS INDEX: 35.32 KG/M2 | SYSTOLIC BLOOD PRESSURE: 100 MMHG | RESPIRATION RATE: 22 BRPM

## 2019-01-21 DIAGNOSIS — I47.10 PAROXYSMAL SUPRAVENTRICULAR TACHYCARDIA (H): ICD-10-CM

## 2019-01-21 DIAGNOSIS — Z95.1 S/P CABG X 3: Primary | ICD-10-CM

## 2019-01-21 DIAGNOSIS — E11.9 NEW ONSET TYPE 2 DIABETES MELLITUS (H): ICD-10-CM

## 2019-01-21 DIAGNOSIS — E66.01 MORBID OBESITY (H): ICD-10-CM

## 2019-01-21 PROBLEM — I25.110 CORONARY ARTERY DISEASE INVOLVING NATIVE CORONARY ARTERY OF NATIVE HEART WITH UNSTABLE ANGINA PECTORIS (H): Status: ACTIVE | Noted: 2019-01-10

## 2019-01-21 LAB
ANION GAP SERPL CALCULATED.3IONS-SCNC: 7 MMOL/L (ref 3–14)
BUN SERPL-MCNC: 16 MG/DL (ref 7–30)
CALCIUM SERPL-MCNC: 8.2 MG/DL (ref 8.5–10.1)
CHLORIDE SERPL-SCNC: 105 MMOL/L (ref 94–109)
CO2 SERPL-SCNC: 27 MMOL/L (ref 20–32)
CREAT SERPL-MCNC: 1.36 MG/DL (ref 0.66–1.25)
ERYTHROCYTE [DISTWIDTH] IN BLOOD BY AUTOMATED COUNT: 13 % (ref 10–15)
GFR SERPL CREATININE-BSD FRML MDRD: 53 ML/MIN/{1.73_M2}
GLUCOSE SERPL-MCNC: 132 MG/DL (ref 70–99)
HCT VFR BLD AUTO: 41 % (ref 40–53)
HGB BLD-MCNC: 13.1 G/DL (ref 13.3–17.7)
MCH RBC QN AUTO: 31.5 PG (ref 26.5–33)
MCHC RBC AUTO-ENTMCNC: 32 G/DL (ref 31.5–36.5)
MCV RBC AUTO: 99 FL (ref 78–100)
PLATELET # BLD AUTO: 368 10E9/L (ref 150–450)
POTASSIUM SERPL-SCNC: 4.5 MMOL/L (ref 3.4–5.3)
RBC # BLD AUTO: 4.16 10E12/L (ref 4.4–5.9)
SODIUM SERPL-SCNC: 139 MMOL/L (ref 133–144)
WBC # BLD AUTO: 10.5 10E9/L (ref 4–11)

## 2019-01-21 PROCEDURE — 80048 BASIC METABOLIC PNL TOTAL CA: CPT | Performed by: FAMILY MEDICINE

## 2019-01-21 PROCEDURE — 85027 COMPLETE CBC AUTOMATED: CPT | Performed by: FAMILY MEDICINE

## 2019-01-21 PROCEDURE — 99495 TRANSJ CARE MGMT MOD F2F 14D: CPT | Performed by: FAMILY MEDICINE

## 2019-01-21 PROCEDURE — 36415 COLL VENOUS BLD VENIPUNCTURE: CPT | Performed by: FAMILY MEDICINE

## 2019-01-21 RX ORDER — ACETAMINOPHEN 325 MG/1
325-650 TABLET ORAL DAILY
COMMUNITY
Start: 2019-01-14

## 2019-01-21 RX ORDER — LANCETS
1 EACH MISCELLANEOUS
Refills: 0 | COMMUNITY
Start: 2019-01-14

## 2019-01-21 RX ORDER — ATORVASTATIN CALCIUM 80 MG/1
80 TABLET, FILM COATED ORAL DAILY
COMMUNITY
Start: 2019-01-14

## 2019-01-21 RX ORDER — AMIODARONE HYDROCHLORIDE 200 MG/1
200 TABLET ORAL DAILY
COMMUNITY
Start: 2019-01-14 | End: 2019-02-11

## 2019-01-21 ASSESSMENT — PAIN SCALES - GENERAL: PAINLEVEL: NO PAIN (0)

## 2019-01-21 NOTE — PROGRESS NOTES
SUBJECTIVE:   Alec Freitas is a 68 year old male who presents to clinic today for the following health issues:            Hospital Follow-up Visit:    Hospital/Nursing Home/IP Rehab Facility: [facility]  Date of Admission: 01/04/2019  Date of Discharge: 01/14/2019  Reason(s) for Admission: Coronary Artery Bypass            Problems taking medications regularly:  None       Medication changes since discharge: amiodarone 1 pill a day to a half pill per day       Problems adhering to non-medication therapy:  None    Summary of hospitalization:  CareEverywhere information obtained and reviewed  Diagnostic Tests/Treatments reviewed.  Follow up needed: CBC, BMP  Other Healthcare Providers Involved in Patient s Care:         Surgical follow-up appointment - next week  Update since discharge: improved. He is walking 10 minutes daily on treadmill at 3 mph. He is checking BS daily 112-120    Post Discharge Medication Reconciliation: discharge medications reconciled, continue medications without change.  Plan of care communicated with patient        INPATIENT DISCHARGE SUMMARY  [facility]    Attending Provider: David Sanchez MD   Primary Care Physician at Discharge: Harjit Hernandez [phone]     Admission Date: 1/4/2019  Discharge Date: 01/14/2019  HOSPITAL SUMMARY   Discharge Diagnosis  Principal Problem:  S/P CABG x 3  Overview: 1/9/19 with Dr. Sanchez Findings: LIMA-LAD; GSV-PDA; GSV-OM  Active Problems:  Hypertension  History of tobacco abuse  Obesity, Class II, BMI 35-39.9  New onset type 2 diabetes mellitus (HCC)  Unstable angina (HCC)  Coronary artery disease involving native coronary artery of native heart with unstable angina pectoris (HCC)  Thrombocytopenia due to extra corporeal by-pass circulation  Overview: S/p platelet transfusion  Resolved Problems:  Atrial fibrillation (HCC)      Hospital Course     Mr. Russell is a 69yo male with past medical history significant  for hypertension, obesity, and remote history of tobacco abuse.  Patient presented with bilateral arm pain on 1/4/18 that was unrelated to physical activity for the last 2-3 weeks.  Also reported feeling increased fatigue.  The patient was admitted for observation under the hospitalist service.  EKG showed non specific T wave abnormalities, mildly elevated troponin at 0.05. Cardiology was consulted and the patient underwent coronary angiography which revealed severe multivessel coronary artery disease including stenosis of the LAD, OM1, and RCA.  Echo revealed EF 50-55%, with hypokinesis involving the apical anterior wall, septal wall, and inferior wall, and no valvular abnormalities.  CV surgery was consulted for the option of undergoing revascularization via bypass procedure.  The patient remained in the hospital on medical therapy awaiting surgery.     On 1/9/19, the patient underwent CABG x 3 with LIMA-LAD, SVG-OM, and SVG-PDA under the care of Dr. Sanchez.  The procedure went without incident, when stable transferred to the ICU.  Patient was extubated per protocol. On POD #1, patient was started on oral medications and transferred to CVTU. Patient was diagnosed with type 2 diabetes mellitus and hospital service was consulted.  He had atrial fibrillation with RVR on the AM of POD3. He subsequently converted back to NSR on the IV amio protocol. He had recurrent rapid afib afternoon of POD #3 which was managed with IV Lopressor. Converted to PO amiodarone. Volume overload was treated with IV Lasix.    Patient continued to progress well and was stable for discharge to home on POD 5. At the time of discharge he was without chest pain, sob, palpitations, dizziness, abdominal pain, or nausea. He was tolerating oral intake and ambulating independently. Pain was well controlled with Tylenol and oxycodone. He had remained in sinus for greater than 24 Hrs at the time of discharge. He will be discharged on a 1 month  "amiodarone taper.     Patient will follow up with PCP in 1 week with Chem 8 and CBC, CV surgery in 4 weeks, and Cardiology in 3 months.       CXR   Left chest tube is been removed. There has been a midline sternotomy with  myocardial revascularization. There is blunting of post posterior costophrenic  angles consistent small pleural effusions with left mid lower lung atelectasis  similar to the previous exam. There is no pneumothorax. The right lung is  clear.     IMPRESSION:  Persistent left mid lower lung atelectasis post chest tube removal.   Consultants  Cardiology, Hospitalist-Diabetes Team    Procedures performed     1/9/2019    Coronary Artery Bypass Graft, Endoscopic Vein [place] Right Leg, Cell Saver    Surgeon(s):  David Sanchez MD  -------------------     Pathology: None     See Hospital Course.      DISCHARGE EXAM   Exam on Day of Discharge  Last vitals taken: BP: 133/85  Pulse: 78  Temp: 98.7  F (37.1  C)  Resp: 18  SpO2: 93 %  Height: 170.2 cm (67\")  Weight: 100.2 kg (220 lb 14.4 oz)  BMI: 35.15    Admission Weight: Weight: 104.8 kg (231 lb)  Discharge Weight: Weight: 100.2 kg (220 lb 14.4 oz)    GENERAL: Patient is alert and orientated.   HEART: Normal rate and regular rhythm. Normal S1 and S2. No murmur noted.  LUNGS: diminished lung sounds involving the left base  ABDOMEN: No tenderness, soft, non-distended   EXTREMITIES: trace edema bilaterally.  INCISION: Sternotomy incision is clean, dry and intact.  Leg incision is clean, dry and intact.    Physical Condition at Discharge  Alec's condition is good .    Patient was seen by David Sanchez* on the day of discharge.  DISCHARGE ORDERS AND MEDICATIONS   Discharge Medications    Current Medication List on Discharge     Started   acetaminophen 325 mg Tablet  Dose: 325-650 mg  Commonly known as: aka: TYLENOL  Take 1-2 tablets (325-650 mg) by mouth every 4 hours as needed for mild pain.    amiodarone 200 mg Tablet  Commonly " known as: aka: PACERONE  Take 1 tablet (200 mg) by mouth twice daily for 7 days, THEN 1 tablet (200 mg) once daily for 21 days.  Start taking on: 1/14/2019    atorvastatin 80 mg Tablet  Dose: 80 mg  Commonly known as: aka: LIPITOR  Take 1 tablet (80 mg) by mouth daily at bedtime.    blood sugar diagnostic strips  Doctor's comments: Contour Next test strips. See Separate DME Supply Letter. May Dispense Patient or Insurance Preferred Brand.  Test once daily.    DIABETIC SUPPLY ORDER LETTER  Doctor's comments: Not Medication order. See Separate DME Supply Prescriptions.  **Diabetic Supply Order Letter Only**    lancets  Doctor's comments: Contour Next lancets (microlet). See Separate DME Supply Letter. May Dispense Patient or Insurance Preferred Brand.  Test once daily.    oxyCODONE 5 mg Tablet  Dose: 5 mg  Commonly known as: aka: OXY IR  Take 1 tablet (5 mg) by mouth every 4 hours as needed for moderate pain or severe pain.      Modified   aspirin 325 mg Tbec  What changed:     medication strength    how much to take    additional instructions  Dose: 325 mg  Commonly known as: aka: GENACOTE  Take 1 tablet (325 mg) by mouth once daily. Ok to substitute 4 baby (81 mg) aspirin  Start taking on: 1/15/2019    metoprolol tartrate 50 mg Tablet  What changed:     medication strength    how much to take    when to take this  Dose: 50 mg  Doctor's comments: Patient will first use existing prescription with 100 mg tablets and cut in half  Commonly known as: aka: LOPRESSOR  Take 1 tablet (50 mg) by mouth twice daily.          Discharge Procedure Orders   Referral to Cardiac Rehab     Order Specific Question Answer Comments   Cardiac Rehab Location Other   Other (specify) [facility]     [facility] Self [facility]   Order Comments: New Diagnosis Type 2 diabetes, hospitalized for atypical chest pain  Lab Results  Component Value Date   A1C 6.8 01/05/2019     Order Specific Question Answer  Comments   Reason for consult Diabetes self management education     [facility] to [facility] Medical Nutrition Management   Order Comments: New Diagnosis Type 2 diabetes, hospitalized for atypical chest pain  Lab Results  Component Value Date   A1C 6.8 01/05/2019     Order Specific Question Answer Comments   Reason for consult Medical Nutrition Management     Diet I should eat when I am home     Order Specific Question Answer Comments   Type of diet I should eat 2000 mg Sodium, Low Fat, Low Caffeine Diet   Type of diet I should eat Diabetic Diet     Reason I was hospitalized   Order Comments: Coronary artery disease requiring coronary artery bypass grafting surgery     Discharge Order     Order Specific Question Answer Comments   Planned discharge date 1/14/2019     Where am I going once I leave the hospital     Order Specific Question Answer Comments   Discharge to Home     Activity after discharge   Order Comments: You can be as active as you can tolerate  Do not lift more than 10 pounds (approximately a gallon of milk) for 4 weeks, then 20 pounds until 3 months after surgery  You may experience mild shortness of breath, not sleeping well, not eating well, and swelling in your incisional leg  Remember to balance activity with rest periods, for awhile you may need more rest than before surgery  Keep your incisions clean and dry. We would like you to shower daily, but no tub baths  Be sure to wash your incision daily with soap and water  No driving for 4 weeks after surgery, unless otherwise discussed  Remember to take deep breaths and cough often during the day  You may use the incentive spirometer  Avoid lotions on your incisions for 6 weeks     How do I care for my incision at home   Order Comments: Keep wound clean and dry  OK to shower daily but no tub baths  May apply bandage to the wound and change daily  If no drainage is noted from the incision then it does not need to be covered  Observe the wound  daily checking for any changes that may be concerning: such as redness, change in drainage, swelling and/or increasing pain  Contact your physician immediately if any changes are noted that are concerning     When should I call my provider   Order Comments: If you experience any of the following:  Faintness,dizziness, incision changes, angina symptoms, sudden increase in shortness of breath or weight gain of 3 pounds or more.   Weigh yourself daily and record the weight for 6 weeks.     Incision changes: Look at your incision daily. Notify physician if you notice redness, drainage, increased pain or tenderness, or an elevated temperature. Note: If you had chest tubes you may have a watery drainage from your chest tube site for 2-3 weeks after surgery, these may be covered with a bandage to protect your clothing. Remove the bandage daily and wash the site.     Provider Follow-Up PCP   Order Comments: PCP: Harjit Hernandez MD     Order Specific Question Answer Comments   When: 1 week   Reason: hospital follow up, s/p CABG   Labs: Chem 8, CBC     Provider Follow-Up Specialist     Order Specific Question Answer Comments   When: 3 months   Reason: s/p CABG   With Who? Cardiology     Provider Follow-Up Attending   Order Comments: JENNIFFER with David Sanchez*     Order Specific Question Answer Comments   When: 4 weeks   Reason: s/p CABG     Blood Sugar Monitoring   Order Comments: Alternate the times, before meals and bedtime. Log your numbers so your provider can review. Call your provider if blood sugar greater than 200 or less than 70 more than two times.     Order Specific Question Answer Comments   Glucose meter checks: Two times per day     We appreciate the opportunity to care for Alec Russell. If you have any questions, please do not hesitate to contact us as outlined below.     Post-Discharge Contacts   For questions within the first 24 hours after discharge, please contact David Sanchez* at  clinic phone: [phone]    Tevin Perez,   Alisa Leyva, GIOVANNY            Diabetes Follow-up    Patient is checking blood sugars: once daily.  Results are as follows:         am - 120    Diabetic concerns: None     Symptoms of hypoglycemia (low blood sugar): none     Paresthesias (numbness or burning in feet) or sores: No     Date of last diabetic eye exam: unknonwn    BP Readings from Last 2 Encounters:   01/21/19 100/68   04/13/18 122/82     LDL Cholesterol Calculated (mg/dL)   Date Value   02/13/2017 102 (H)   02/10/2016 90       Diabetes Management Resources  Hyperlipidemia Follow-Up      Rate your low fat/cholesterol diet?: good    Taking statin?  Yes, no muscle aches from statin    Other lipid medications/supplements?:  none    Hypertension Follow-up      Outpatient blood pressures are being checked at home.  Results are stable.    Low Salt Diet: no added salt      Problem list and histories reviewed & adjusted, as indicated.  Additional history: as documented    Patient Active Problem List   Diagnosis     CARDIOVASCULAR SCREENING; LDL GOAL LESS THAN 130     Hypertension     Advanced directives, counseling/discussion     Non morbid obesity due to excess calories     Morbid obesity (H)     Coronary artery disease involving native coronary artery of native heart with unstable angina pectoris (H)     New onset type 2 diabetes mellitus (H)     S/P CABG x 3     Past Surgical History:   Procedure Laterality Date     CORONARY ARTERY BYPASS Bilateral 01/09/2019     FUSION LUMBAR POSTERIOR ARTIFICIAL DISK WITH SYSTEM  8/30/10    [facility]     HC COLONOSCOPY W/WO BRUSH/WASH  04/28/06    Repeat in 10 years       Social History     Tobacco Use     Smoking status: Never Smoker     Smokeless tobacco: Former User     Types: Chew   Substance Use Topics     Alcohol use: Yes     Alcohol/week: 1.0 oz     Comment: once in a while     Family History   Problem Relation Age of Onset     Hypertension Mother       MIRNA Mother      Hypertension Brother      Cerebrovascular Disease Maternal Grandmother      Cerebrovascular Disease Maternal Grandfather      Cancer Father         unknown primary spread to lungs     Heart Disease Father         pacemaker-since he was in his 70's     JASMYNE. Brother         Stent X 4     Anesthesia Reaction No family hx of          Current Outpatient Medications   Medication Sig Dispense Refill     acetaminophen (TYLENOL) 325 MG tablet Take 325-650 mg by mouth daily       amiodarone (PACERONE/CODARONE) 200 MG tablet Take 200 mg by mouth daily       atorvastatin (LIPITOR) 80 MG tablet Take 80 mg by mouth daily       CONTOUR NEXT TEST test strip 1 strip daily   0     metoprolol tartrate (LOPRESSOR) 100 MG tablet Take 1 tablet (100 mg) by mouth daily 90 tablet 3     MICROLET LANCETS MISC 1 lancet  0     ASPIRIN 81 MG OR TABS one daily 100 0     Allergies   Allergen Reactions     No Known Drug Allergies      Recent Labs   Lab Test 04/13/18  0851 02/13/17  0912 02/10/16  0941  01/02/14  1648   LDL  --  102* 90  --  107   HDL  --  52 50  --  54   TRIG  --  144 115  --  154*   CR 1.00 1.13 1.12   < > 1.05   GFRESTIMATED 74 65 66   < > 71   GFRESTBLACK 90 78 79   < > 86   POTASSIUM 4.5 4.8 4.5   < > 5.1    < > = values in this interval not displayed.      BP Readings from Last 3 Encounters:   01/21/19 100/68   04/13/18 122/82   02/13/17 136/88    Wt Readings from Last 3 Encounters:   01/21/19 98.5 kg (217 lb 3.2 oz)   04/13/18 103.4 kg (227 lb 14.4 oz)   02/13/17 104 kg (229 lb 3.2 oz)                  Labs reviewed in EPIC    Reviewed and updated as needed this visit by clinical staff  Tobacco  Allergies  Meds  Med Hx  Surg Hx  Fam Hx  Soc Hx      Reviewed and updated as needed this visit by Provider         ROS:  CONSTITUTIONAL: NEGATIVE for fever, chills, change in weight  ENT/MOUTH: NEGATIVE for ear, mouth and throat problems  RESP: NEGATIVE for significant cough or SOB  CV: NEGATIVE for chest  pain, palpitations or peripheral edema  ROS otherwise negative    OBJECTIVE:     /68 (BP Location: Right arm, Patient Position: Chair, Cuff Size: Adult Regular)   Pulse 66   Temp 98  F (36.7  C) (Temporal)   Resp 22   Wt 98.5 kg (217 lb 3.2 oz)   SpO2 97%   BMI 35.32 kg/m    Body mass index is 35.32 kg/m .  GENERAL: healthy, alert and no distress  NECK: no adenopathy, no asymmetry, masses, or scars and thyroid normal to palpation  RESP: lungs clear to auscultation - no rales, rhonchi or wheezes  CV: regular rate and rhythm, normal S1 S2, no S3 or S4, no murmur, click or rub, no peripheral edema and peripheral pulses strong  ABDOMEN: soft, nontender, no hepatosplenomegaly, no masses and bowel sounds normal  MS: no gross musculoskeletal defects noted, no edema  SKIN: no suspicious lesions or rashes and WHSS from CABG.    Diagnostic Test Results:  No results found for this or any previous visit (from the past 24 hour(s)).    ASSESSMENT/PLAN:     1. S/P CABG x 3  Mr. Russell is a 69yo male with past medical history significant for hypertension, obesity, and remote history of tobacco abuse.  Patient presented with bilateral arm pain on 1/4/18 that was unrelated to physical activity for the last 2-3 weeks.  Also reported feeling increased fatigue.  The patient was admitted for observation under the hospitalist service.  EKG showed non specific T wave abnormalities, mildly elevated troponin at 0.05. Cardiology was consulted and the patient underwent coronary angiography which revealed severe multivessel coronary artery disease including stenosis of the LAD, OM1, and RCA.  Echo revealed EF 50-55%, with hypokinesis involving the apical anterior wall, septal wall, and inferior wall, and no valvular abnormalities.  CV surgery was consulted for the option of undergoing revascularization via bypass procedure.  The patient remained in the hospital on medical therapy awaiting surgery.     On 1/9/19, the patient  "underwent CABG x 3 with LIMA-LAD, SVG-OM, and SVG-PDA under the care of Dr. Sanchez.  The procedure went without incident, when stable transferred to the ICU.  Patient was extubated per protocol. On POD #1, patient was started on oral medications and transferred to CVTU. Patient was diagnosed with type 2 diabetes mellitus and hospital service was consulted.  He had atrial fibrillation with RVR on the AM of POD3. He subsequently converted back to NSR on the IV amio protocol. He had recurrent rapid afib afternoon of POD #3 which was managed with IV Lopressor. Converted to PO amiodarone. Volume overload was treated with IV Lasix.    Patient continued to progress well and was stable for discharge to home on POD 5.    He continues to do well without recurrent chest pain, dyspnea or palpitations.  The current medical regimen is effective;  continue present plan and medications. Recheck CBC and BMP.  Follow up in 1 month.  - Basic metabolic panel  - CBC with platelets    2. New onset type 2 diabetes mellitus (H)  New diagnosis as inpatient based on A1C=6.8. Working on diet and exercise. No oral agents started. Continue monitoring BS and recheck in 1 month.  - Basic metabolic panel  - CBC with platelets    3. Morbid obesity (H)  BMI:   Estimated body mass index is 35.32 kg/m  as calculated from the following:    Height as of 4/13/18: 1.67 m (5' 5.75\").    Weight as of this encounter: 98.5 kg (217 lb 3.2 oz).   Weight management plan: Discussed healthy diet and exercise guidelines      4. Paroxysmal supraventricular tachycardia (H)  Post op atrial fibrillation, resolved with oral Amiodarone and Metoprolol. The current medical regimen is effective;  continue present plan and medications.       FUTURE APPOINTMENTS:       - Follow-up visit in 1 monht       - Make appointment with CV surgery in 3 weeks.    Harjit Hernandez MD  [facility]  "

## 2019-01-23 ENCOUNTER — HOSPITAL ENCOUNTER (OUTPATIENT)
Dept: CARDIAC REHAB | Facility: CLINIC | Age: 69
End: 2019-01-23
Payer: COMMERCIAL

## 2019-01-23 PROCEDURE — 93798 PHYS/QHP OP CAR RHAB W/ECG: CPT

## 2019-01-23 PROCEDURE — 40000575 ZZH STATISTIC OP CARDIAC VISIT #2

## 2019-01-23 PROCEDURE — 93797 PHYS/QHP OP CAR RHAB WO ECG: CPT

## 2019-01-23 PROCEDURE — 40000116 ZZH STATISTIC OP CR VISIT

## 2019-01-25 ENCOUNTER — TELEPHONE (OUTPATIENT)
Dept: FAMILY MEDICINE | Facility: OTHER | Age: 69
End: 2019-01-25

## 2019-01-25 DIAGNOSIS — E11.9 NEW ONSET TYPE 2 DIABETES MELLITUS (H): Primary | ICD-10-CM

## 2019-01-25 NOTE — TELEPHONE ENCOUNTER
Letter sent to patient with lab results. Orders have been placed for future.   Charla Gutierrez MA

## 2019-01-25 NOTE — LETTER
January 25, 2019      Alec Freitas  [id number] [address] [address]        Dear Mr.Van Paez,    We are writing to inform you of your test results.    Kidney function and hemoglobin are still down due to recent heart surgery but stable. The current medical regimen is effective;  continue present plan and medications. Recheck labs in 1 month.     Resulted Orders   Basic metabolic panel   Result Value Ref Range    Sodium 139 133 - 144 mmol/L    Potassium 4.5 3.4 - 5.3 mmol/L    Chloride 105 94 - 109 mmol/L    Carbon Dioxide 27 20 - 32 mmol/L    Anion Gap 7 3 - 14 mmol/L    Glucose 132 (H) 70 - 99 mg/dL    Urea Nitrogen 16 7 - 30 mg/dL    Creatinine 1.36 (H) 0.66 - 1.25 mg/dL    GFR Estimate 53 (L) >60 mL/min/[1.73_m2]      Comment:      Non  GFR Calc  Starting 12/18/2018, serum creatinine based estimated GFR (eGFR) will be   calculated using the Chronic Kidney Disease Epidemiology Collaboration   (CKD-EPI) equation.      GFR Estimate If Black 61 >60 mL/min/[1.73_m2]      Comment:       GFR Calc  Starting 12/18/2018, serum creatinine based estimated GFR (eGFR) will be   calculated using the Chronic Kidney Disease Epidemiology Collaboration   (CKD-EPI) equation.      Calcium 8.2 (L) 8.5 - 10.1 mg/dL   CBC with platelets   Result Value Ref Range    WBC 10.5 4.0 - 11.0 10e9/L    RBC Count 4.16 (L) 4.4 - 5.9 10e12/L    Hemoglobin 13.1 (L) 13.3 - 17.7 g/dL    Hematocrit 41.0 40.0 - 53.0 %    MCV 99 78 - 100 fl    MCH 31.5 26.5 - 33.0 pg    MCHC 32.0 31.5 - 36.5 g/dL    RDW 13.0 10.0 - 15.0 %    Platelet Count 368 150 - 450 10e9/L       If you have any questions or concerns, please call the clinic at the number listed above.       Sincerely,        Harjit Hernandez MD

## 2019-01-25 NOTE — TELEPHONE ENCOUNTER
----- Message from Harjit Hernandez MD sent at 1/25/2019  8:32 AM CST -----  Please notify patient, call or letter.  Kidney function and hemoglobin are still down due to recent heart surgery but stable. The current medical regimen is effective;  continue present plan and medications. Recheck labs in 1 month.  Electronically signed by Harjit Hernandez MD

## 2019-01-30 ENCOUNTER — HOSPITAL ENCOUNTER (OUTPATIENT)
Dept: CARDIAC REHAB | Facility: CLINIC | Age: 69
End: 2019-01-30
Attending: PHYSICIAN ASSISTANT
Payer: COMMERCIAL

## 2019-01-30 PROCEDURE — 40000116 ZZH STATISTIC OP CR VISIT

## 2019-01-30 PROCEDURE — 93798 PHYS/QHP OP CAR RHAB W/ECG: CPT

## 2019-02-06 ENCOUNTER — HOSPITAL ENCOUNTER (OUTPATIENT)
Dept: CARDIAC REHAB | Facility: CLINIC | Age: 69
End: 2019-02-06
Attending: PHYSICIAN ASSISTANT
Payer: COMMERCIAL

## 2019-02-06 PROCEDURE — 40000575 ZZH STATISTIC OP CARDIAC VISIT #2

## 2019-02-06 PROCEDURE — 93798 PHYS/QHP OP CAR RHAB W/ECG: CPT

## 2019-02-06 PROCEDURE — 40000116 ZZH STATISTIC OP CR VISIT

## 2019-02-06 NOTE — PROGRESS NOTES
Cardiac Rehab Discharge Summary    Reason for therapy discharge:    Patient/family request discontinuation of services.    Progress towards therapy goal(s). See goals on Care Plan in [facility] electronic health record for goal details.  Goals partially met.  Barriers to achieving goals:   discharge from facility.    Therapy recommendation(s):    Continue home exercise program.

## 2019-02-13 ENCOUNTER — TRANSFERRED RECORDS (OUTPATIENT)
Dept: HEALTH INFORMATION MANAGEMENT | Facility: CLINIC | Age: 69
End: 2019-02-13

## 2019-05-02 ENCOUNTER — TRANSFERRED RECORDS (OUTPATIENT)
Dept: HEALTH INFORMATION MANAGEMENT | Facility: CLINIC | Age: 69
End: 2019-05-02

## 2020-03-10 ENCOUNTER — TRANSFERRED RECORDS (OUTPATIENT)
Dept: HEALTH INFORMATION MANAGEMENT | Facility: CLINIC | Age: 70
End: 2020-03-10

## 2020-03-10 LAB
CREAT SERPL-MCNC: 1.02 MG/DL (ref 0.72–1.25)
GFR SERPL CREATININE-BSD FRML MDRD: >60 ML/MIN/1.73M(2)
GLUCOSE SERPL-MCNC: 126 MG/DL (ref 70–100)
INR PPP: 1 (ref 0.9–1.1)
POTASSIUM SERPL-SCNC: 4.7 MMOL/L (ref 3.5–5.1)

## 2020-07-13 ENCOUNTER — OFFICE VISIT (OUTPATIENT)
Dept: FAMILY MEDICINE | Facility: CLINIC | Age: 70
End: 2020-07-13
Payer: MEDICARE

## 2020-07-13 ENCOUNTER — TELEPHONE (OUTPATIENT)
Dept: FAMILY MEDICINE | Facility: OTHER | Age: 70
End: 2020-07-13

## 2020-07-13 VITALS
HEART RATE: 72 BPM | BODY MASS INDEX: 37.16 KG/M2 | SYSTOLIC BLOOD PRESSURE: 124 MMHG | TEMPERATURE: 98.1 F | WEIGHT: 228.5 LBS | RESPIRATION RATE: 18 BRPM | OXYGEN SATURATION: 94 % | DIASTOLIC BLOOD PRESSURE: 70 MMHG

## 2020-07-13 DIAGNOSIS — L02.212 ABSCESS OF BACK: Primary | ICD-10-CM

## 2020-07-13 PROCEDURE — 87070 CULTURE OTHR SPECIMN AEROBIC: CPT | Performed by: PHYSICIAN ASSISTANT

## 2020-07-13 PROCEDURE — 87077 CULTURE AEROBIC IDENTIFY: CPT | Performed by: PHYSICIAN ASSISTANT

## 2020-07-13 PROCEDURE — 10060 I&D ABSCESS SIMPLE/SINGLE: CPT | Performed by: PHYSICIAN ASSISTANT

## 2020-07-13 RX ORDER — CEPHALEXIN 500 MG/1
500 CAPSULE ORAL 3 TIMES DAILY
Qty: 21 CAPSULE | Refills: 0 | Status: SHIPPED | OUTPATIENT
Start: 2020-07-13 | End: 2020-07-20

## 2020-07-13 ASSESSMENT — ENCOUNTER SYMPTOMS
CHILLS: 0
WOUND: 1
JOINT SWELLING: 0
MYALGIAS: 0
WHEEZING: 0
FEVER: 0
ADENOPATHY: 0
SHORTNESS OF BREATH: 0
HEADACHES: 0
ARTHRALGIAS: 0
FATIGUE: 0
DIAPHORESIS: 0
COUGH: 0

## 2020-07-13 NOTE — PATIENT INSTRUCTIONS
Antibiotics as directed- Keflex 500 mg 3 times daily for 7 days.  Culture sent. Call if antibiotic change needed.  Apply a thin layer of antibiotic ointment and cover with a bandage.  Change bandage twice daily, more often if saturated  Discussed close evaluation of symptoms.   The affected area was outlined with a skin marker.   Follow up with primary care provider with any problems, questions or concerns or if symptoms worsen or fail to improve.

## 2020-07-13 NOTE — TELEPHONE ENCOUNTER
Routing to [facility] to see if okay doing Video visit or would you prefer to see patient in clinic.     Charla Gutierrez MA

## 2020-07-13 NOTE — TELEPHONE ENCOUNTER
He should be seen in person. I can fit him into my schedule today if he's able to come in.    AMY Haynes [facility]

## 2020-07-13 NOTE — TELEPHONE ENCOUNTER
Reason for Call:  Same Day Appointment, Requested Provider:  Meme Sanchez    PCP: Harjit Hernandez    Reason for visit: boil on back. Feels it may be infected. Asking for an antibiotic. Willing to do a video visit before 3p today. Are you able to see him?    Duration of symptoms:     Have you been treated for this in the past? No    Additional comments:     Can we leave a detailed message on this number? Yes    Phone number patient can be reached at: Home number on file [phone] (home)    Best Time:     Call taken on 7/13/2020 at 12:52 PM by Kristel Samaniego

## 2020-07-13 NOTE — PROGRESS NOTES
Subjective     Alec Freitas is a 70 year old male who presents to clinic today for the following health issues:    HPI       Possible ingrown hair/boil       Duration: About 6 months, worse weeks     Description (location/character/radiation): Top part of back     Intensity:  moderate    Accompanying signs and symptoms: Drainage, possible sunburn     History (similar episodes/previous evaluation): None    Precipitating or alleviating factors: None    Therapies tried and outcome: OTC antibiotic cream, drained the boil at home.      Alec has had this mass in his back for months without any problems. It got worse a little over a week ago and is now tender. It looked like it came to a head a few days ago so his wife squeezed it to help it drain but over the last few days it has just worsened. It has thick yellow discharge that drains constantly. He does not feel systemically ill. No fever. He does not have significant pain over this area.    Reviewed and updated as needed this visit by Provider      Review of Systems   Constitutional: Negative for chills, diaphoresis, fatigue and fever.   Respiratory: Negative for cough, shortness of breath and wheezing.    Musculoskeletal: Negative for arthralgias, joint swelling and myalgias.   Skin: Positive for wound (left upper back). Negative for pallor and rash.   Neurological: Negative for headaches.   Hematological: Negative for adenopathy.          Objective    /70   Pulse 72   Temp 98.1  F (36.7  C) (Temporal)   Resp 18   Wt 103.6 kg (228 lb 8 oz)   SpO2 94%   BMI 37.16 kg/m    Body mass index is 37.16 kg/m .     GENERAL: healthy, alert and no distress  NECK: no adenopathy, no asymmetry, masses, or scars and thyroid normal to palpation  RESP: lungs clear to auscultation - no rales, rhonchi or wheezes  CV: regular rate and rhythm, normal S1 S2, no S3 or S4, no murmur, click or rub, no peripheral edema and peripheral pulses strong  SKIN: no suspicious rashes.  Mild sunburn on shoulders and upper back. Left mid upper back with a mass about 2 inches in diameter with several punctures that are oozing creamy yellow purulent discharge.Of note, he has a significant amount of hair on his back.    A sample of the discharge was collected for culture. The area was cleaned with gauze then with iodine swabs x3. About .75mL of lidocaine and epinephrine was injected into the lesion. 2 incisions, each about 0.5cm were made into the abscess. About 3 CC of purulent discharge and white thickened sebaceous material with blood were expressed from the incisions. Bacitracin ointment was applied and the area dressed with a bandage. Patient tolerated the procedure well.    Assessment & Plan     1. Abscess of back  I&D  - Wound Culture Aerobic Bacterial  - cephALEXin (KEFLEX) 500 MG capsule; Take 1 capsule (500 mg) by mouth 3 times daily for 7 days  Dispense: 21 capsule; Refill: 0  -Drain Skin Abscess Simple/Single       Patient Instructions   Antibiotics as directed- Keflex 500 mg 3 times daily for 7 days.  Culture sent. Call if antibiotic change needed.  Apply a thin layer of antibiotic ointment and cover with a bandage.  Change bandage twice daily, more often if saturated  Discussed close evaluation of symptoms.   The affected area was outlined with a skin marker.   Follow up with primary care provider with any problems, questions or concerns or if symptoms worsen or fail to improve.           Return in about 1 month (around 8/13/2020) for Physical Exam with primary care provider.    Meme Sanchez PA-C  [facility]

## 2020-07-16 LAB
BACTERIA SPEC CULT: ABNORMAL
Lab: ABNORMAL
SPECIMEN SOURCE: ABNORMAL

## 2020-07-17 ENCOUNTER — TELEPHONE (OUTPATIENT)
Dept: FAMILY MEDICINE | Facility: CLINIC | Age: 70
End: 2020-07-17

## 2020-07-17 NOTE — TELEPHONE ENCOUNTER
Left voicemail to have Alec return my call.     I wanted to check in on his symptoms to make sure he's improving and to let him know that he should finish the antibiotic. 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    AMY [facility]

## 2020-07-17 NOTE — TELEPHONE ENCOUNTER
Alec states his back is still draining but feels much better. Inflammation has improved. Advised that he continue Keflex and call for an appointment if symptoms persist. May need general surgery appointment for excision if symptoms are persistent. He is agreeable to this plan.  Petrona Sanchez PA-C  [facility]

## 2022-11-07 NOTE — TELEPHONE ENCOUNTER
Pt's elevated blood pressure reviewed with Dr. Hou. Instructions given to not treat blood pressure, and to call for SBP> 180, DSP >100.  Pt denies headache, dizziness or weakness. Will continue to monitor.       "Hospital/TCU/ED for chronic condition Discharge Protocol    \"Hi, my name is Patti Rose, a registered nurse, and I am calling from [facility].  I am calling to follow up and see how things are going for you after your recent emergency visit/hospital/TCU stay.\"    Tell me how you are doing now that you are home?\" Doing great\"      Discharge Instructions    \"Let's review your discharge instructions.  What is/are the follow-up recommendations?  Pt. Response: Follow up with PCP    \"Has an appointment with your primary care provider been scheduled?\"   Yes. (confirm)    \"When you see the provider, I would recommend that you bring your medications with you.\"    Medications    \"Tell me what changed about your medicines when you discharged?\"    Changes to chronic meds?    0-1    \"What questions do you have about your medications?\"    None     New diagnoses of heart failure, COPD, diabetes, or MI?    No              Medication reconciliation completed? Yes  Was MTM referral placed (*Make sure to put transitions as reason for referral)?   No    Call Summary    \"What questions or concerns do you have about your recent visit and your follow-up care?\"     none    \"If you have questions or things don't continue to improve, we encourage you contact us through the main clinic number (give number).  Even if the clinic is not open, triage nurses are available 24/7 to help you.     We would like you to know that our clinic has extended hours (provide information).  We also have urgent care (provide details on closest location and hours/contact info)\"      \"Thank you for your time and take care!\"    LAURA Singer, RN  [facility]  "